# Patient Record
Sex: FEMALE | Race: BLACK OR AFRICAN AMERICAN | NOT HISPANIC OR LATINO | Employment: UNEMPLOYED | URBAN - METROPOLITAN AREA
[De-identification: names, ages, dates, MRNs, and addresses within clinical notes are randomized per-mention and may not be internally consistent; named-entity substitution may affect disease eponyms.]

---

## 2022-11-28 ENCOUNTER — HOSPITAL ENCOUNTER (EMERGENCY)
Facility: CLINIC | Age: 32
Discharge: LEFT BEFORE DISCHARGE | End: 2022-11-28
Admitting: EMERGENCY MEDICINE
Payer: COMMERCIAL

## 2022-11-28 VITALS
HEART RATE: 116 BPM | OXYGEN SATURATION: 99 % | RESPIRATION RATE: 18 BRPM | DIASTOLIC BLOOD PRESSURE: 73 MMHG | SYSTOLIC BLOOD PRESSURE: 110 MMHG | TEMPERATURE: 97 F

## 2022-11-28 PROCEDURE — 99282 EMERGENCY DEPT VISIT SF MDM: CPT

## 2022-11-28 NOTE — ED TRIAGE NOTES
Patient was about 13 weeks pregnant.  She was seen 11/17 after having vaginal bleeding.  She states she saw the fetus on 11/17.  The bleeding had slowed down but around 0300 this morning she started having severe vaginal bleeding where she is soaking through a pad each half hour.  She states the US did show the placenta last time she was seen.

## 2022-11-28 NOTE — ED PROVIDER NOTES
Rapid Assessment Note    History:   Belem Jackson is a 32 year old female who presents with after miscarriage starting 11/24. She states that the vaginal bleeding worsened at 0300 and she is having having increased abdominal pain. The patient reports that she was 13 weeks and 5 days when she started having miscarriage. She believes she has passed the placenta. The patient has been changing her pad ever 30 minutes. Increased dizziness when standing.      Exam:   General:  Alert, interactive  Cardiovascular:  Well perfused  Lungs:  No respiratory distress, no accessory muscle use  Neuro:  Moving all 4 extremities  Skin:  Warm, dry  Psych:  Normal affect      Plan of Care:   I evaluated the patient and developed an initial plan of care. I discussed this plan and explained that I, or one of my partners, would be returning to complete the evaluation.       11/28/2022  [facility]    Portions of this medical record were completed by a scribe. UPON MY REVIEW AND AUTHENTICATION BY ELECTRONIC SIGNATURE, this confirms (a) I performed the applicable clinical services, and (b) the record is accurate.     Due to hospital and departmental capacity constraints and prolonged wait times, this patient was evaluated in non-traditional circumstances such as in triage/waiting room, a hallway, etc. I explained the option to wait for a traditional treatment space and apologized for the inconvenience. Given the circumstances, every attempt was made to provide for the patient's comfort and privacy and to perform the most thorough evaluation possible.       Hossein Hilliard MD  11/28/22 [id number]